# Patient Record
Sex: MALE | Race: WHITE | NOT HISPANIC OR LATINO | URBAN - METROPOLITAN AREA
[De-identification: names, ages, dates, MRNs, and addresses within clinical notes are randomized per-mention and may not be internally consistent; named-entity substitution may affect disease eponyms.]

---

## 2020-11-04 ENCOUNTER — TELEPHONE (OUTPATIENT)
Dept: NEUROSURGERY | Facility: CLINIC | Age: 50
End: 2020-11-04

## 2020-11-04 NOTE — TELEPHONE ENCOUNTER
Spoke to this pt and his wife and informed them that [facility] is not currently accepting any new medicaid pt. I offered the pt the medicaid escalation number and the pt declined stating medicaid is currently helping her find a provider.      Understanding was verbalized        ----- Message from Janice Ambrocio sent at 11/4/2020 11:10 AM CST -----  Regarding: an appt  Contact: pt  Caller is requesting a call back regarding an appt.  Please call back at [phone].  Thanks.

## 2020-11-17 ENCOUNTER — TELEPHONE (OUTPATIENT)
Dept: NEUROSURGERY | Facility: CLINIC | Age: 50
End: 2020-11-17

## 2020-11-17 NOTE — TELEPHONE ENCOUNTER
Spoke with patient's wife, she states that medicaid had initially denied his surgery but then approved the surgery which is scheduled on 11/18/20. Referral done but appointment not needed.